# Patient Record
Sex: FEMALE | Race: WHITE | NOT HISPANIC OR LATINO | Employment: OTHER | URBAN - NONMETROPOLITAN AREA
[De-identification: names, ages, dates, MRNs, and addresses within clinical notes are randomized per-mention and may not be internally consistent; named-entity substitution may affect disease eponyms.]

---

## 2017-02-24 ENCOUNTER — OFFICE VISIT (OUTPATIENT)
Dept: URGENT CARE | Facility: PHYSICIAN GROUP | Age: 77
End: 2017-02-24
Payer: MEDICARE

## 2017-02-24 VITALS
DIASTOLIC BLOOD PRESSURE: 90 MMHG | RESPIRATION RATE: 16 BRPM | OXYGEN SATURATION: 94 % | WEIGHT: 150 LBS | HEIGHT: 65 IN | HEART RATE: 100 BPM | BODY MASS INDEX: 24.99 KG/M2 | SYSTOLIC BLOOD PRESSURE: 130 MMHG | TEMPERATURE: 97.6 F

## 2017-02-24 DIAGNOSIS — R51.9 NONINTRACTABLE HEADACHE, UNSPECIFIED CHRONICITY PATTERN, UNSPECIFIED HEADACHE TYPE: ICD-10-CM

## 2017-02-24 PROCEDURE — 1101F PT FALLS ASSESS-DOCD LE1/YR: CPT | Mod: 8P | Performed by: PHYSICIAN ASSISTANT

## 2017-02-24 PROCEDURE — 1036F TOBACCO NON-USER: CPT | Performed by: PHYSICIAN ASSISTANT

## 2017-02-24 PROCEDURE — 3017F COLORECTAL CA SCREEN DOC REV: CPT | Mod: 8P | Performed by: PHYSICIAN ASSISTANT

## 2017-02-24 PROCEDURE — G8484 FLU IMMUNIZE NO ADMIN: HCPCS | Performed by: PHYSICIAN ASSISTANT

## 2017-02-24 PROCEDURE — G8432 DEP SCR NOT DOC, RNG: HCPCS | Performed by: PHYSICIAN ASSISTANT

## 2017-02-24 PROCEDURE — G8420 CALC BMI NORM PARAMETERS: HCPCS | Performed by: PHYSICIAN ASSISTANT

## 2017-02-24 PROCEDURE — 99213 OFFICE O/P EST LOW 20 MIN: CPT | Performed by: PHYSICIAN ASSISTANT

## 2017-02-24 PROCEDURE — 3014F SCREEN MAMMO DOC REV: CPT | Mod: 8P | Performed by: PHYSICIAN ASSISTANT

## 2017-02-24 PROCEDURE — 4040F PNEUMOC VAC/ADMIN/RCVD: CPT | Mod: 8P | Performed by: PHYSICIAN ASSISTANT

## 2017-02-25 NOTE — PROGRESS NOTES
Chief Complaint   Patient presents with   • Otalgia     pain behind the ears       HISTORY OF PRESENT ILLNESS: Patient is a 76 y.o. female who presents today for evaluation of a headache that started about an hour ago. Patient's  states that he woke her up to see if she wanted to go get some pizza and she was complaining of a headache. She complains of pain on the left sided occipital region stating it is nontender to palpation but that she does have a pressure-like pain in that area. She denies dizziness, vision changes, nausea, vomiting, imbalance issues. She denies any histories of the same symptoms. She has not taken any over-the-counter medication.    Patient Active Problem List    Diagnosis Date Noted   • Dementia 12/19/2016   • Dysphagia 12/18/2016   • Hypokalemia 12/16/2016   • HTN (hypertension) 12/16/2016   • Cholelithiasis 12/16/2016   • Acute on chronic kidney failure (CMS-HCC) 12/16/2016   • Bipolar disorder (CMS-HCC) 12/16/2016   • Pancreatitis 12/16/2016   • Renal cyst 12/16/2016   • Pancreatic mass 12/15/2016       Allergies:Review of patient's allergies indicates no known allergies.    Current Outpatient Prescriptions Ordered in [facility]   Medication Sig Dispense Refill   • amlodipine (NORVASC) 10 MG Tab Take 1 Tab by mouth every day. 30 Tab 1   • lisinopril (PRINIVIL) 20 MG Tab Take 20 mg by mouth every day.     • raloxifene (EVISTA) 60 MG Tab Take 60 mg by mouth every day.     • oxcarbazepine (TRILEPTAL) 150 MG TABS Take 150 mg by mouth 2 times a day.     • fluphenazine (PROLIXIN) 2.5 MG TABS Take 2.5 mg by mouth every day.       No current [facility]-ordered facility-administered medications on file.       History reviewed. No pertinent past medical history.    Social History   Substance Use Topics   • Smoking status: Former Smoker   • Smokeless tobacco: Never Used   • Alcohol Use: No       No family status information on file.   History reviewed. No pertinent family history.    ROS:   Review of  "Systems   Constitutional: Negative for fever, chills, weight loss and malaise/fatigue.   HENT: Negative for ear pain, nosebleeds, congestion, sore throat and neck pain.    Eyes: Negative for blurred vision.   Respiratory: Negative for cough, sputum production, shortness of breath and wheezing.    Cardiovascular: Negative for chest pain, palpitations, orthopnea and leg swelling.   Gastrointestinal: Negative for heartburn, nausea, vomiting and abdominal pain.   Genitourinary: Negative for dysuria, urgency and frequency.       Exam:  Blood pressure 130/90, pulse 100, temperature 36.4 °C (97.6 °F), resp. rate 16, height 1.651 m (5' 5\"), weight 68.04 kg (150 lb), SpO2 94 %.  General: Normal appearing. No distress.  HEENT: Conjunctiva clear, lids without ptosis, ears normal shape and contour, canals are clear bilaterally, tympanic membranes are benign, nasal mucosa benign, oropharynx is without erythema, edema or exudates. The head is nontender to palpation in the area of pain.  Neck: Full range of motion but pain in the area of concern with rotation to the right. The neck itself is nontender to palpation.  Pulmonary: Clear to ausculation and percussion.  Normal effort. No rales, ronchi, or wheezing.   Cardiovascular: Regular rate and rhythm without murmur.   Neurologic: Grossly nonfocal. No tongue deviation noted. Negative pronator drift.  Skin: No obvious lesions.  Psych: Normal mood. Alert and oriented x3. Judgment and insight is normal.    Assessment/Plan:  Recommend applying heat to the affected area and trying over-the-counter acetaminophen. Follow up for worsening or persistent symptoms. Discussed ER precautions for any strokelike symptoms.  1. Nonintractable headache, unspecified chronicity pattern, unspecified headache type           "

## 2017-06-20 ENCOUNTER — RESOLUTE PROFESSIONAL BILLING HOSPITAL PROF FEE (OUTPATIENT)
Dept: HOSPITALIST | Facility: MEDICAL CENTER | Age: 77
End: 2017-06-20
Payer: MEDICARE

## 2017-06-20 ENCOUNTER — APPOINTMENT (OUTPATIENT)
Dept: RADIOLOGY | Facility: MEDICAL CENTER | Age: 77
End: 2017-06-20
Attending: EMERGENCY MEDICINE
Payer: MEDICARE

## 2017-06-20 ENCOUNTER — HOSPITAL ENCOUNTER (OUTPATIENT)
Facility: MEDICAL CENTER | Age: 77
End: 2017-06-21
Attending: EMERGENCY MEDICINE | Admitting: HOSPITALIST
Payer: MEDICARE

## 2017-06-20 DIAGNOSIS — I10 ESSENTIAL HYPERTENSION: ICD-10-CM

## 2017-06-20 DIAGNOSIS — R55 NEAR SYNCOPE: ICD-10-CM

## 2017-06-20 DIAGNOSIS — R00.2 PALPITATIONS: ICD-10-CM

## 2017-06-20 LAB
ALBUMIN SERPL BCP-MCNC: 4.3 G/DL (ref 3.2–4.9)
ALBUMIN/GLOB SERPL: 1.4 G/DL
ALP SERPL-CCNC: 61 U/L (ref 30–99)
ALT SERPL-CCNC: 16 U/L (ref 2–50)
ANION GAP SERPL CALC-SCNC: 10 MMOL/L (ref 0–11.9)
APPEARANCE UR: CLEAR
AST SERPL-CCNC: 15 U/L (ref 12–45)
BASOPHILS # BLD AUTO: 0.5 % (ref 0–1.8)
BASOPHILS # BLD: 0.02 K/UL (ref 0–0.12)
BILIRUB SERPL-MCNC: 0.5 MG/DL (ref 0.1–1.5)
BILIRUB UR QL STRIP.AUTO: NEGATIVE
BNP SERPL-MCNC: 11 PG/ML (ref 0–100)
BUN SERPL-MCNC: 27 MG/DL (ref 8–22)
CALCIUM SERPL-MCNC: 10.3 MG/DL (ref 8.5–10.5)
CHLORIDE SERPL-SCNC: 106 MMOL/L (ref 96–112)
CO2 SERPL-SCNC: 24 MMOL/L (ref 20–33)
COLOR UR: COLORLESS
CREAT SERPL-MCNC: 1.5 MG/DL (ref 0.5–1.4)
CULTURE IF INDICATED INDCX: NO UA CULTURE
DEPRECATED D DIMER PPP IA-ACNC: 485 NG/ML(D-DU)
EKG IMPRESSION: NORMAL
EKG IMPRESSION: NORMAL
EOSINOPHIL # BLD AUTO: 0.09 K/UL (ref 0–0.51)
EOSINOPHIL NFR BLD: 2.2 % (ref 0–6.9)
ERYTHROCYTE [DISTWIDTH] IN BLOOD BY AUTOMATED COUNT: 44.5 FL (ref 35.9–50)
EST. AVERAGE GLUCOSE BLD GHB EST-MCNC: 120 MG/DL
GFR SERPL CREATININE-BSD FRML MDRD: 34 ML/MIN/1.73 M 2
GLOBULIN SER CALC-MCNC: 3 G/DL (ref 1.9–3.5)
GLUCOSE SERPL-MCNC: 91 MG/DL (ref 65–99)
GLUCOSE UR STRIP.AUTO-MCNC: NEGATIVE MG/DL
HBA1C MFR BLD: 5.8 % (ref 0–5.6)
HCT VFR BLD AUTO: 40.6 % (ref 37–47)
HGB BLD-MCNC: 13.3 G/DL (ref 12–16)
IMM GRANULOCYTES # BLD AUTO: 0.01 K/UL (ref 0–0.11)
IMM GRANULOCYTES NFR BLD AUTO: 0.2 % (ref 0–0.9)
KETONES UR STRIP.AUTO-MCNC: 10 MG/DL
LEUKOCYTE ESTERASE UR QL STRIP.AUTO: NEGATIVE
LYMPHOCYTES # BLD AUTO: 1.71 K/UL (ref 1–4.8)
LYMPHOCYTES NFR BLD: 41 % (ref 22–41)
MAGNESIUM SERPL-MCNC: 2.1 MG/DL (ref 1.5–2.5)
MCH RBC QN AUTO: 28.6 PG (ref 27–33)
MCHC RBC AUTO-ENTMCNC: 32.8 G/DL (ref 33.6–35)
MCV RBC AUTO: 87.3 FL (ref 81.4–97.8)
MICRO URNS: ABNORMAL
MONOCYTES # BLD AUTO: 0.32 K/UL (ref 0–0.85)
MONOCYTES NFR BLD AUTO: 7.7 % (ref 0–13.4)
NEUTROPHILS # BLD AUTO: 2.02 K/UL (ref 2–7.15)
NEUTROPHILS NFR BLD: 48.4 % (ref 44–72)
NITRITE UR QL STRIP.AUTO: NEGATIVE
NRBC # BLD AUTO: 0 K/UL
NRBC BLD AUTO-RTO: 0 /100 WBC
PH UR STRIP.AUTO: 6.5 [PH]
PLATELET # BLD AUTO: 148 K/UL (ref 164–446)
PMV BLD AUTO: 8.6 FL (ref 9–12.9)
POTASSIUM SERPL-SCNC: 4.1 MMOL/L (ref 3.6–5.5)
PROT SERPL-MCNC: 7.3 G/DL (ref 6–8.2)
PROT UR QL STRIP: NEGATIVE MG/DL
RBC # BLD AUTO: 4.65 M/UL (ref 4.2–5.4)
RBC UR QL AUTO: NEGATIVE
SODIUM SERPL-SCNC: 140 MMOL/L (ref 135–145)
SP GR UR STRIP.AUTO: 1.01
T4 FREE SERPL-MCNC: 0.88 NG/DL (ref 0.53–1.43)
TROPONIN I SERPL-MCNC: <0.01 NG/ML (ref 0–0.04)
TSH SERPL DL<=0.005 MIU/L-ACNC: 0.73 UIU/ML (ref 0.3–3.7)
WBC # BLD AUTO: 4.2 K/UL (ref 4.8–10.8)

## 2017-06-20 PROCEDURE — 81003 URINALYSIS AUTO W/O SCOPE: CPT

## 2017-06-20 PROCEDURE — 71275 CT ANGIOGRAPHY CHEST: CPT

## 2017-06-20 PROCEDURE — 93005 ELECTROCARDIOGRAM TRACING: CPT

## 2017-06-20 PROCEDURE — 85379 FIBRIN DEGRADATION QUANT: CPT

## 2017-06-20 PROCEDURE — A9270 NON-COVERED ITEM OR SERVICE: HCPCS | Performed by: HOSPITALIST

## 2017-06-20 PROCEDURE — 85025 COMPLETE CBC W/AUTO DIFF WBC: CPT

## 2017-06-20 PROCEDURE — 84484 ASSAY OF TROPONIN QUANT: CPT

## 2017-06-20 PROCEDURE — 96361 HYDRATE IV INFUSION ADD-ON: CPT

## 2017-06-20 PROCEDURE — 700102 HCHG RX REV CODE 250 W/ 637 OVERRIDE(OP): Performed by: HOSPITALIST

## 2017-06-20 PROCEDURE — 83036 HEMOGLOBIN GLYCOSYLATED A1C: CPT

## 2017-06-20 PROCEDURE — 99285 EMERGENCY DEPT VISIT HI MDM: CPT

## 2017-06-20 PROCEDURE — G0378 HOSPITAL OBSERVATION PER HR: HCPCS

## 2017-06-20 PROCEDURE — 83880 ASSAY OF NATRIURETIC PEPTIDE: CPT

## 2017-06-20 PROCEDURE — 93005 ELECTROCARDIOGRAM TRACING: CPT | Performed by: HOSPITALIST

## 2017-06-20 PROCEDURE — 99220 PR INITIAL OBSERVATION CARE,LEVL III: CPT | Performed by: HOSPITALIST

## 2017-06-20 PROCEDURE — 96360 HYDRATION IV INFUSION INIT: CPT | Mod: XU

## 2017-06-20 PROCEDURE — 700105 HCHG RX REV CODE 258: Performed by: EMERGENCY MEDICINE

## 2017-06-20 PROCEDURE — 96372 THER/PROPH/DIAG INJ SC/IM: CPT | Mod: XU

## 2017-06-20 PROCEDURE — 93005 ELECTROCARDIOGRAM TRACING: CPT | Performed by: NURSE PRACTITIONER

## 2017-06-20 PROCEDURE — 71010 DX-CHEST-PORTABLE (1 VIEW): CPT

## 2017-06-20 PROCEDURE — 700105 HCHG RX REV CODE 258: Performed by: HOSPITALIST

## 2017-06-20 PROCEDURE — 84439 ASSAY OF FREE THYROXINE: CPT

## 2017-06-20 PROCEDURE — 93010 ELECTROCARDIOGRAM REPORT: CPT | Performed by: INTERNAL MEDICINE

## 2017-06-20 PROCEDURE — 80053 COMPREHEN METABOLIC PANEL: CPT

## 2017-06-20 PROCEDURE — 84443 ASSAY THYROID STIM HORMONE: CPT

## 2017-06-20 PROCEDURE — 700117 HCHG RX CONTRAST REV CODE 255: Performed by: EMERGENCY MEDICINE

## 2017-06-20 PROCEDURE — 36415 COLL VENOUS BLD VENIPUNCTURE: CPT

## 2017-06-20 PROCEDURE — 83735 ASSAY OF MAGNESIUM: CPT

## 2017-06-20 PROCEDURE — 700111 HCHG RX REV CODE 636 W/ 250 OVERRIDE (IP): Performed by: HOSPITALIST

## 2017-06-20 RX ORDER — AMLODIPINE BESYLATE 10 MG/1
10 TABLET ORAL DAILY
Status: DISCONTINUED | OUTPATIENT
Start: 2017-06-21 | End: 2017-06-21 | Stop reason: HOSPADM

## 2017-06-20 RX ORDER — OXCARBAZEPINE 150 MG/1
150 TABLET, FILM COATED ORAL 2 TIMES DAILY
Status: DISCONTINUED | OUTPATIENT
Start: 2017-06-20 | End: 2017-06-21 | Stop reason: HOSPADM

## 2017-06-20 RX ORDER — FLUPHENAZINE HYDROCHLORIDE 1 MG/1
2.5 TABLET ORAL DAILY
Status: DISCONTINUED | OUTPATIENT
Start: 2017-06-21 | End: 2017-06-21 | Stop reason: HOSPADM

## 2017-06-20 RX ORDER — LABETALOL HYDROCHLORIDE 5 MG/ML
10 INJECTION, SOLUTION INTRAVENOUS EVERY 4 HOURS PRN
Status: DISCONTINUED | OUTPATIENT
Start: 2017-06-20 | End: 2017-06-21 | Stop reason: HOSPADM

## 2017-06-20 RX ORDER — ASPIRIN 81 MG/1
324 TABLET, CHEWABLE ORAL DAILY
Status: DISCONTINUED | OUTPATIENT
Start: 2017-06-20 | End: 2017-06-21 | Stop reason: HOSPADM

## 2017-06-20 RX ORDER — LORAZEPAM 1 MG/1
0.5 TABLET ORAL EVERY 6 HOURS PRN
Status: DISCONTINUED | OUTPATIENT
Start: 2017-06-20 | End: 2017-06-21 | Stop reason: HOSPADM

## 2017-06-20 RX ORDER — LORAZEPAM 2 MG/ML
0.5 INJECTION INTRAMUSCULAR EVERY 6 HOURS PRN
Status: DISCONTINUED | OUTPATIENT
Start: 2017-06-20 | End: 2017-06-21 | Stop reason: HOSPADM

## 2017-06-20 RX ORDER — AMOXICILLIN 250 MG
2 CAPSULE ORAL 2 TIMES DAILY
Status: DISCONTINUED | OUTPATIENT
Start: 2017-06-20 | End: 2017-06-21 | Stop reason: HOSPADM

## 2017-06-20 RX ORDER — RALOXIFENE HYDROCHLORIDE 60 MG/1
60 TABLET, FILM COATED ORAL DAILY
Status: DISCONTINUED | OUTPATIENT
Start: 2017-06-21 | End: 2017-06-21 | Stop reason: HOSPADM

## 2017-06-20 RX ORDER — NITROGLYCERIN 0.4 MG/1
0.4 TABLET SUBLINGUAL
Status: DISCONTINUED | OUTPATIENT
Start: 2017-06-20 | End: 2017-06-21 | Stop reason: HOSPADM

## 2017-06-20 RX ORDER — LISINOPRIL 20 MG/1
20 TABLET ORAL DAILY
Status: DISCONTINUED | OUTPATIENT
Start: 2017-06-20 | End: 2017-06-20

## 2017-06-20 RX ORDER — ACETAMINOPHEN 325 MG/1
650 TABLET ORAL EVERY 6 HOURS PRN
Status: DISCONTINUED | OUTPATIENT
Start: 2017-06-20 | End: 2017-06-21 | Stop reason: HOSPADM

## 2017-06-20 RX ORDER — HEPARIN SODIUM 5000 [USP'U]/ML
5000 INJECTION, SOLUTION INTRAVENOUS; SUBCUTANEOUS EVERY 8 HOURS
Status: DISCONTINUED | OUTPATIENT
Start: 2017-06-20 | End: 2017-06-21 | Stop reason: HOSPADM

## 2017-06-20 RX ORDER — AMLODIPINE BESYLATE 10 MG/1
10 TABLET ORAL DAILY
COMMUNITY

## 2017-06-20 RX ORDER — ZOLPIDEM TARTRATE 5 MG/1
5 TABLET ORAL
Status: DISCONTINUED | OUTPATIENT
Start: 2017-06-20 | End: 2017-06-21 | Stop reason: HOSPADM

## 2017-06-20 RX ORDER — SODIUM CHLORIDE 9 MG/ML
INJECTION, SOLUTION INTRAVENOUS CONTINUOUS
Status: DISCONTINUED | OUTPATIENT
Start: 2017-06-20 | End: 2017-06-21 | Stop reason: HOSPADM

## 2017-06-20 RX ORDER — SODIUM CHLORIDE 9 MG/ML
1000 INJECTION, SOLUTION INTRAVENOUS ONCE
Status: COMPLETED | OUTPATIENT
Start: 2017-06-20 | End: 2017-06-20

## 2017-06-20 RX ORDER — POLYETHYLENE GLYCOL 3350 17 G/17G
1 POWDER, FOR SOLUTION ORAL
Status: DISCONTINUED | OUTPATIENT
Start: 2017-06-20 | End: 2017-06-21 | Stop reason: HOSPADM

## 2017-06-20 RX ORDER — BISACODYL 10 MG
10 SUPPOSITORY, RECTAL RECTAL
Status: DISCONTINUED | OUTPATIENT
Start: 2017-06-20 | End: 2017-06-21 | Stop reason: HOSPADM

## 2017-06-20 RX ORDER — ASPIRIN 325 MG
325 TABLET ORAL DAILY
Status: DISCONTINUED | OUTPATIENT
Start: 2017-06-20 | End: 2017-06-21 | Stop reason: HOSPADM

## 2017-06-20 RX ORDER — ASPIRIN 300 MG/1
300 SUPPOSITORY RECTAL DAILY
Status: DISCONTINUED | OUTPATIENT
Start: 2017-06-20 | End: 2017-06-21 | Stop reason: HOSPADM

## 2017-06-20 RX ORDER — ONDANSETRON 2 MG/ML
4 INJECTION INTRAMUSCULAR; INTRAVENOUS EVERY 4 HOURS PRN
Status: DISCONTINUED | OUTPATIENT
Start: 2017-06-20 | End: 2017-06-21 | Stop reason: HOSPADM

## 2017-06-20 RX ORDER — ONDANSETRON 4 MG/1
4 TABLET, ORALLY DISINTEGRATING ORAL EVERY 4 HOURS PRN
Status: DISCONTINUED | OUTPATIENT
Start: 2017-06-20 | End: 2017-06-21 | Stop reason: HOSPADM

## 2017-06-20 RX ORDER — MORPHINE SULFATE 4 MG/ML
1 INJECTION, SOLUTION INTRAMUSCULAR; INTRAVENOUS EVERY 4 HOURS PRN
Status: DISCONTINUED | OUTPATIENT
Start: 2017-06-20 | End: 2017-06-21 | Stop reason: HOSPADM

## 2017-06-20 RX ADMIN — IOHEXOL 100 ML: 350 INJECTION, SOLUTION INTRAVENOUS at 15:20

## 2017-06-20 RX ADMIN — METOPROLOL TARTRATE 12.5 MG: 25 TABLET, FILM COATED ORAL at 17:48

## 2017-06-20 RX ADMIN — OXCARBAZEPINE 150 MG: 150 TABLET, FILM COATED ORAL at 20:44

## 2017-06-20 RX ADMIN — SODIUM CHLORIDE: 9 INJECTION, SOLUTION INTRAVENOUS at 17:49

## 2017-06-20 RX ADMIN — HEPARIN SODIUM 5000 UNITS: 5000 INJECTION, SOLUTION INTRAVENOUS; SUBCUTANEOUS at 21:53

## 2017-06-20 RX ADMIN — SODIUM CHLORIDE 1000 ML: 9 INJECTION, SOLUTION INTRAVENOUS at 15:13

## 2017-06-20 RX ADMIN — SENNOSIDES AND DOCUSATE SODIUM 2 TABLET: 8.6; 5 TABLET ORAL at 20:44

## 2017-06-20 RX ADMIN — ASPIRIN 325 MG: 325 TABLET, COATED ORAL at 17:48

## 2017-06-20 ASSESSMENT — LIFESTYLE VARIABLES
EVER_SMOKED: NEVER
DO YOU DRINK ALCOHOL: NO
ALCOHOL_USE: NO

## 2017-06-20 ASSESSMENT — PAIN SCALES - GENERAL
PAINLEVEL_OUTOF10: 0

## 2017-06-20 NOTE — ED PROVIDER NOTES
ED Provider Note    CHIEF COMPLAINT  Chief Complaint   Patient presents with   • Palpitations     sx for 2 weeks.  EKG done in triage       HPI  Skylar Benjamin is a 76 y.o. female who presents with a two-week history of heavy anterior chest palpitation.  She denies rapid palpitations.  Today for the 1st time, she felt like she was going to pass out.  She denies exertional chest pain.  No orthopnea.  No new leg swelling.  Patient quit smoking years ago.  She denies fever.  No cough.  No nausea, no abdominal pain.  No headache, no acute numbness or weakness.  Patient states the palpitations come and go on a daily basis, stating they're more present than not.  She denies exacerbating factors.    REVIEW OF SYSTEMS    Constitutional: No fever.  Dizziness today.  Respiratory: Denies shortness of breath  Cardiac: Heart palpitations.  Near syncope  Gastrointestinal: No abdominal pain, no vomiting  Musculoskeletal: No neck or back pain  Neurologic: No headache  Skin: No edema       All other systems are negative.       PAST MEDICAL HISTORY  No past medical history on file.    FAMILY HISTORY  No family history on file.    SOCIAL HISTORY  Social History     Social History   • Marital Status:      Spouse Name: N/A   • Number of Children: N/A   • Years of Education: N/A     Social History Main Topics   • Smoking status: Former Smoker   • Smokeless tobacco: Never Used   • Alcohol Use: No   • Drug Use: No   • Sexual Activity: Not on file     Other Topics Concern   • Not on file     Social History Narrative       SURGICAL HISTORY  No past surgical history on file.    CURRENT MEDICATIONS  Home Medications     Reviewed by Pam Anne (Pharmacy Tech) on 06/20/17 at 1531  Med List Status: Complete    Medication Last Dose Status    amlodipine (NORVASC) 10 MG Tab 6/20/2017 Active    Cyanocobalamin 1000 MCG SL Tab 6/15/2017 Active    fluphenazine (PROLIXIN) 2.5 MG TABS 6/20/2017 Active    oxcarbazepine (TRILEPTAL) 150 MG TABS  "6/20/2017 Active    raloxifene (EVISTA) 60 MG Tab 6/20/2017 Active    vitamin D (CHOLECALCIFEROL) 1000 UNIT Tab 6/15/2017 Active                ALLERGIES  No Known Allergies    PHYSICAL EXAM  VITAL SIGNS: /95 mmHg  Pulse 89  Temp(Src) 37.1 °C (98.8 °F)  Resp 25  Ht 1.651 m (5' 5\")  Wt 65.3 kg (143 lb 15.4 oz)  BMI 23.96 kg/m2  SpO2 91%  Constitutional:  Non-toxic appearance.   HENT: No facial swelling  Eyes: Anicteric, no conjunctivitis.     Cardiovascular: Normal heart rate, Normal rhythm  Pulmonary:  No wheezing, No rales.  Diminished breath sounds in both bases.  Gastrointestinal: Soft, No tenderness, No masses  Skin: Warm, Dry, No cyanosis.  No peripheral edema  Neurologic: Speech is clear, follows commands, facial expression is symmetrical.  Sensation normal  Psychiatric: Affect normal,  Mood normal.   Musculoskeletal: Negative Homans sign.  Chest wall nontender    EKG/Labs  Results for orders placed or performed during the hospital encounter of 06/20/17   CBC WITH DIFFERENTIAL   Result Value Ref Range    WBC 4.2 (L) 4.8 - 10.8 K/uL    RBC 4.65 4.20 - 5.40 M/uL    Hemoglobin 13.3 12.0 - 16.0 g/dL    Hematocrit 40.6 37.0 - 47.0 %    MCV 87.3 81.4 - 97.8 fL    MCH 28.6 27.0 - 33.0 pg    MCHC 32.8 (L) 33.6 - 35.0 g/dL    RDW 44.5 35.9 - 50.0 fL    Platelet Count 148 (L) 164 - 446 K/uL    MPV 8.6 (L) 9.0 - 12.9 fL    Neutrophils-Polys 48.40 44.00 - 72.00 %    Lymphocytes 41.00 22.00 - 41.00 %    Monocytes 7.70 0.00 - 13.40 %    Eosinophils 2.20 0.00 - 6.90 %    Basophils 0.50 0.00 - 1.80 %    Immature Granulocytes 0.20 0.00 - 0.90 %    Nucleated RBC 0.00 /100 WBC    Neutrophils (Absolute) 2.02 2.00 - 7.15 K/uL    Lymphs (Absolute) 1.71 1.00 - 4.80 K/uL    Monos (Absolute) 0.32 0.00 - 0.85 K/uL    Eos (Absolute) 0.09 0.00 - 0.51 K/uL    Baso (Absolute) 0.02 0.00 - 0.12 K/uL    Immature Granulocytes (abs) 0.01 0.00 - 0.11 K/uL    NRBC (Absolute) 0.00 K/uL   COMP METABOLIC PANEL   Result Value Ref Range "    Sodium 140 135 - 145 mmol/L    Potassium 4.1 3.6 - 5.5 mmol/L    Chloride 106 96 - 112 mmol/L    Co2 24 20 - 33 mmol/L    Anion Gap 10.0 0.0 - 11.9    Glucose 91 65 - 99 mg/dL    Bun 27 (H) 8 - 22 mg/dL    Creatinine 1.50 (H) 0.50 - 1.40 mg/dL    Calcium 10.3 8.5 - 10.5 mg/dL    AST(SGOT) 15 12 - 45 U/L    ALT(SGPT) 16 2 - 50 U/L    Alkaline Phosphatase 61 30 - 99 U/L    Total Bilirubin 0.5 0.1 - 1.5 mg/dL    Albumin 4.3 3.2 - 4.9 g/dL    Total Protein 7.3 6.0 - 8.2 g/dL    Globulin 3.0 1.9 - 3.5 g/dL    A-G Ratio 1.4 g/dL   TROPONIN   Result Value Ref Range    Troponin I <0.01 0.00 - 0.04 ng/mL   BTYPE NATRIURETIC PEPTIDE   Result Value Ref Range    B Natriuretic Peptide 11 0 - 100 pg/mL   TSH   Result Value Ref Range    TSH 0.730 0.300 - 3.700 uIU/mL   FREE THYROXINE   Result Value Ref Range    Free T-4 0.88 0.53 - 1.43 ng/dL   D-DIMER   Result Value Ref Range    D-Dimer Screen 485 (H) <250 ng/mL(D-DU)   ESTIMATED GFR   Result Value Ref Range    GFR If  41 (A) >60 mL/min/1.73 m 2    GFR If Non  34 (A) >60 mL/min/1.73 m 2   EKG (NOW)   Result Value Ref Range    Report       [facility]    Test Date:  2017  Pt Name:    SATURNINO JACOBS                    Department: ER  MRN:        [id number]                      Room:  Gender:     F                            Technician: [id number]  :        [id number]                   Requested By:ER TRIAGE PROTOCOL  Order #:    [id number]                    Natalie MD:    Measurements  Intervals                                Axis  Rate:       91                           P:          -65  HI:         216                          QRS:        49  QRSD:       128                          T:          -13  QT:         368  QTc:        453    Interpretive Statements  SINUS OR ECTOPIC ATRIAL RHYTHM  FIRST DEGREE AV BLOCK  PROBABLE LEFT ATRIAL ABNORMALITY  RIGHT BUNDLE BRANCH BLOCK  BASELINE WANDER IN LEAD(S) V5  No previous  ECG available for comparison           RADIOLOGY/PROCEDURES  DX-CHEST-PORTABLE (1 VIEW)   Final Result         1. No acute cardiopulmonary abnormalities are identified.      CT-CTA CHEST PULMONARY ARTERY W/ RECONS    (Results Pending)   NM-CARDIAC STRESS TEST    (Results Pending)         COURSE & MEDICAL DECISION MAKING  Pertinent Labs & Imaging studies reviewed. (See chart for details)  Post contacted by the radiology department regarding the patient's creatinine at 1.5, slightly high for IV contrast.  However given the EKG change, and after discussion with cardiology, plan for immediate IV hydration with CT scan was needed at this time to determine whether the ST segment changes are secondary to pulmonary embolism versus possible ischemia.  The patient did have a positive d-dimer prompting the ordering of CT scan.  Patient is currently without chest pain.  Her blood pressure improved while she was in the ER.  She is admitted for ongoing workup and medical stabilization.  The patient had been given aspirin prehospital.    FINAL IMPRESSION     1. Palpitations    2. Essential hypertension    3. Near syncope                    Electronically signed by: Murtaza Valentin, 6/20/2017 3:41 PM

## 2017-06-20 NOTE — ED NOTES
Med rec updated and complete.  Allergies reviewed.  Dicussed current medications with  at bedside.   stated that he takes care of his wife's medications.

## 2017-06-20 NOTE — ED NOTES
Report to CDU RN. Pt aware of POC/ pt transferred to floor by RN/ belongings on gurney/ s/o at bedside.

## 2017-06-20 NOTE — DISCHARGE PLANNING
Care Transition Team Assessment    Information Source  Orientation : Oriented x 4  Information Given By: Patient  Who is responsible for making decisions for patient? : Patient    Readmission Evaluation  Is this a readmission?: No    Elopement Risk  Legal Hold: No  Ambulatory or Self Mobile in Wheelchair: No-Not an Elopement Risk    Interdisciplinary Discharge Planning  Does Admitting Nurse Feel This Could be a Complex Discharge?: No  Primary Care Physician: Kraig  Lives with - Patient's Self Care Capacity: Unable To Determine At This Time  Support Systems: Family Member(s), Spouse / Significant Other  Housing / Facility: [address]  Do You Take your Prescribed Medications Regularly: Yes  Able to Return to Previous ADL's: Yes  Mobility Issues: No  Prior Services: None  Patient Expects to be Discharged to:: home  Assistance Needed: No    Discharge Preparedness  What is your plan after discharge?: Uncertain - pending medical team collaboration  What are your discharge supports?: Spouse  Prior Functional Level: Ambulatory  Difficulity with ADLs: None  Difficulity with IADLs: None    Functional Assesment  Prior Functional Level: Ambulatory    Finances  Financial Barriers to Discharge: No  Prescription Coverage: Yes    Vision / Hearing Impairment  Vision Impairment : No  Hearing Impairment : No    Values / Beliefs / Concerns  Values / Beliefs Concerns : No    Advance Directive  Advance Directive?: None, DPOA for Health Care  Durable Power of  Name and Contact :   Advance Directive offered?: AD Booklet given    Domestic Abuse  Have you ever been the victim of abuse or violence?: No  Physical Abuse or Sexual Abuse: No  Verbal Abuse or Emotional Abuse: No  Possible Abuse Reported to:: Not Applicable    Psychological Assessment  History of Substance Abuse: None  History of Psychiatric Problems: No  Non-compliant with Treatment: No  Newly Diagnosed Illness: No    Discharge Risks or Barriers  Discharge risks  or barriers?: No    Anticipated Discharge Information  Anticipated discharge disposition: Home  Discharge Address: face sheet

## 2017-06-20 NOTE — ED NOTES
Chief Complaint   Patient presents with   • Palpitations     sx for 2 weeks.  EKG done in triage       Pt denies pain or SOB

## 2017-06-20 NOTE — IP AVS SNAPSHOT
" Home Care Instructions                                                                                                                  Name:Skylar Benjamin  Medical Record Number:[id number]  CSN: [id number]    YOB: 1940   Age: 76 y.o.  Sex: female  HT:1.651 m (5' 5\") WT: 65.3 kg (143 lb 15.4 oz)          Admit Date: 6/20/2017     Discharge Date:   Today's Date: 6/21/2017  Attending Doctor:  Edwin Bonner M.D.                  Allergies:  Review of patient's allergies indicates no known allergies.            Discharge Instructions       Discharge Instructions    Discharged to home by car with relative. Discharged via wheelchair, hospital escort: Yes.  Special equipment needed: Not Applicable    Be sure to schedule a follow-up appointment with your primary care doctor or any specialists as instructed.     Discharge Plan:   Diet Plan: Discussed  Activity Level: Discussed  Confirmed Follow up Appointment: Patient to Call and Schedule Appointment  Confirmed Symptoms Management: Discussed  Medication Reconciliation Updated: Yes  Influenza Vaccine Indication: Patient Refuses    I understand that a diet low in cholesterol, fat, and sodium is recommended for good health. Unless I have been given specific instructions below for another diet, I accept this instruction as my diet prescription.   Other diet: Regular    Special Instructions: None    · Is patient discharged on Warfarin / Coumadin?   No     · Is patient Post Blood Transfusion?  No    Depression / Suicide Risk    As you are discharged from this [facility] facility, it is important to learn how to keep safe from harming yourself.    Recognize the warning signs:  · Abrupt changes in personality, positive or negative- including increase in energy   · Giving away possessions  · Change in eating patterns- significant weight changes-  positive or negative  · Change in sleeping patterns- unable to sleep or sleeping all the time   · Unwillingness or inability to " communicate  · Depression  · Unusual sadness, discouragement and loneliness  · Talk of wanting to die  · Neglect of personal appearance   · Rebelliousness- reckless behavior  · Withdrawal from people/activities they love  · Confusion- inability to concentrate     If you or a loved one observes any of these behaviors or has concerns about self-harm, here's what you can do:  · Talk about it- your feelings and reasons for harming yourself  · Remove any means that you might use to hurt yourself (examples: pills, rope, extension cords, firearm)  · Get professional help from the community (Mental Health, Substance Abuse, psychological counseling)  · Do not be alone:Call your Safe Contact- someone whom you trust who will be there for you.  · Call your local CRISIS HOTLINE 454-4909 or 256-383-2733  · Call your local Children's Mobile Crisis Response Team [place] (745) 701-1715 or www.Zignals  · Call the toll free National Suicide Prevention Hotlines   · National Suicide Prevention Lifeline 952-801-SSWQ (5060)  · [employer] Hope Line Network 800-SUICIDE (060-6592)        Follow-up Information     1. Follow up with Micky Cornell M.D.. Schedule an appointment as soon as possible for a visit in 1 week.    Specialty:  Family Medicine    Contact information    [address]  [place] [address] [phone]           Discharge Medication Instructions:    Below are the medications your physician expects you to take upon discharge:    Review all your home medications and newly ordered medications with your doctor and/or pharmacist. Follow medication instructions as directed by your doctor and/or pharmacist.    Please keep your medication list with you and share with your physician.               Medication List      START taking these medications        Instructions    Morning Afternoon Evening Bedtime    metoprolol SR 25 MG Tb24   Commonly known as:  TOPROL XL        Take 1 Tab by mouth every day.   Dose:  25 mg                           CONTINUE taking these medications        Instructions    Morning Afternoon Evening Bedtime    amlodipine 10 MG Tabs   Last time this was given:  10 mg on 6/21/2017 10:33 AM   Commonly known as:  NORVASC        Take 10 mg by mouth every day.   Dose:  10 mg                        Cyanocobalamin 1000 MCG Subl        Place  under tongue.                        fluphenazine 2.5 MG Tabs   Last time this was given:  2.5 mg on 6/21/2017 10:34 AM   Commonly known as:  PROLIXIN        Take 2.5 mg by mouth every day.   Dose:  2.5 mg                        oxcarbazepine 150 MG Tabs   Last time this was given:  150 mg on 6/21/2017 10:35 AM   Commonly known as:  TRILEPTAL        Take 150 mg by mouth 2 times a day.   Dose:  150 mg                        raloxifene 60 MG Tabs   Last time this was given:  60 mg on 6/21/2017 10:35 AM   Commonly known as:  EVISTA        Take 60 mg by mouth every day.   Dose:  60 mg                        vitamin D 1000 UNIT Tabs   Commonly known as:  cholecalciferol        Take 1,000 Units by mouth every day.   Dose:  1000 Units                             Where to Get Your Medications      These medications were sent to [facility] #[id number] - [place] - [address]  [address] [place] [address]     Phone:  [phone]    - metoprolol SR 25 MG Tb24            Instructions           Diet / Nutrition:    Follow any diet instructions given to you by your doctor or the dietician, including how much salt (sodium) you are allowed each day.    If you are overweight, talk to your doctor about a weight reduction plan.    Activity:    Remain physically active following your doctor's instructions about exercise and activity.    Rest often.     Any time you become even a little tired or short of breath, SIT DOWN and rest.    Worsening Symptoms:    Report any of the following signs and symptoms to the doctor's office immediately:    *Pain of jaw, arm, or neck  *Chest pain not  relieved by medication                               *Dizziness or loss of consciousness  *Difficulty breathing even when at rest   *More tired than usual                                       *Bleeding drainage or swelling of surgical site  *Swelling of feet, ankles, legs or stomach                 *Fever (>100ºF)  *Pink or blood tinged sputum  *Weight gain (3lbs/day or 5lbs /week)           *Shock from internal defibrillator (if applicable)  *Palpitations or irregular heartbeats                *Cool and/or numb extremities    Stroke Awareness    Common Risk Factors for Stroke include:    Age  Atrial Fibrillation  Carotid Artery Stenosis  Diabetes Mellitus  Excessive alcohol consumption  High blood pressure  Overweight   Physical inactivity  Smoking    Warning signs and symptoms of a stroke include:    *Sudden numbness or weakness of the face, arm or leg (especially on one side of the body).  *Sudden confusion, trouble speaking or understanding.  *Sudden trouble seeing in one or both eyes.  *Sudden trouble walking, dizziness, loss of balance or coordination.Sudden severe headache with no known cause.    It is very important to get treatment quickly when a stroke occurs. If you experience any of the above warning signs, call 911 immediately.                   Disclaimer         Quit Smoking / Tobacco Use:    I understand the use of any tobacco products increases my chance of suffering from future heart disease or stroke and could cause other illnesses which may shorten my life. Quitting the use of tobacco products is the single most important thing I can do to improve my health. For further information on smoking / tobacco cessation call a Toll Free Quit Line at 1-298.468.5503 (*[employer]) or 1-168.388.7968 (American Lung Association) or you can access the web based program at www.lungusa.org.    [place] Tobacco Users Help Line:  (528) 999-5109       Toll Free: 1-595.967.6102  Quit Tobacco Program  [facility] (078)728-2738    Crisis Hotline:    [place] Crisis Hotline:  3-507-YVPQXHF or 1-350.730.5890    [place] Crisis Hotline:    1-680.463.3352 or 388-142-6232    Discharge Survey:   Thank you for choosing [facility]. We hope we did everything we could to make your hospital stay a pleasant one. You may be receiving a phone survey and we would appreciate your time and participation in answering the questions. Your input is very valuable to us in our efforts to improve our service to our patients and their families.        My signature on this form indicates that:    1. I have reviewed and understand the above information.  2. My questions regarding this information have been answered to my satisfaction.  3. I have formulated a plan with my discharge nurse to obtain my prescribed medications for home.                  Disclaimer         __________________________________                     __________       ________                       Patient Signature                                                 Date                    Time

## 2017-06-21 ENCOUNTER — APPOINTMENT (OUTPATIENT)
Dept: RADIOLOGY | Facility: MEDICAL CENTER | Age: 77
End: 2017-06-21
Attending: HOSPITALIST
Payer: MEDICARE

## 2017-06-21 ENCOUNTER — PATIENT OUTREACH (OUTPATIENT)
Dept: HEALTH INFORMATION MANAGEMENT | Facility: OTHER | Age: 77
End: 2017-06-21

## 2017-06-21 VITALS
OXYGEN SATURATION: 94 % | HEIGHT: 65 IN | TEMPERATURE: 98.6 F | HEART RATE: 80 BPM | SYSTOLIC BLOOD PRESSURE: 131 MMHG | RESPIRATION RATE: 20 BRPM | DIASTOLIC BLOOD PRESSURE: 81 MMHG | WEIGHT: 143.96 LBS | BODY MASS INDEX: 23.99 KG/M2

## 2017-06-21 PROBLEM — R00.2 HEART PALPITATIONS: Status: RESOLVED | Noted: 2017-06-20 | Resolved: 2017-06-21

## 2017-06-21 PROBLEM — N18.30 STAGE 3 CHRONIC KIDNEY DISEASE (HCC): Status: ACTIVE | Noted: 2017-06-21

## 2017-06-21 PROBLEM — N18.30 STAGE 3 CHRONIC KIDNEY DISEASE: Chronic | Status: ACTIVE | Noted: 2017-06-21

## 2017-06-21 LAB
ANION GAP SERPL CALC-SCNC: 7 MMOL/L (ref 0–11.9)
BUN SERPL-MCNC: 28 MG/DL (ref 8–22)
CALCIUM SERPL-MCNC: 10 MG/DL (ref 8.5–10.5)
CHLORIDE SERPL-SCNC: 110 MMOL/L (ref 96–112)
CHOLEST SERPL-MCNC: 242 MG/DL (ref 100–199)
CO2 SERPL-SCNC: 23 MMOL/L (ref 20–33)
CREAT SERPL-MCNC: 1.51 MG/DL (ref 0.5–1.4)
EKG IMPRESSION: NORMAL
EKG IMPRESSION: NORMAL
GFR SERPL CREATININE-BSD FRML MDRD: 34 ML/MIN/1.73 M 2
GLUCOSE SERPL-MCNC: 90 MG/DL (ref 65–99)
HDLC SERPL-MCNC: 51 MG/DL
LDLC SERPL CALC-MCNC: 152 MG/DL
LV EJECT FRACT  99904: 60
LV EJECT FRACT MOD 2C 99903: 49.3
LV EJECT FRACT MOD 4C 99902: 66.85
LV EJECT FRACT MOD BP 99901: 60.52
POTASSIUM SERPL-SCNC: 3.9 MMOL/L (ref 3.6–5.5)
SODIUM SERPL-SCNC: 140 MMOL/L (ref 135–145)
TRIGL SERPL-MCNC: 197 MG/DL (ref 0–149)

## 2017-06-21 PROCEDURE — A9502 TC99M TETROFOSMIN: HCPCS

## 2017-06-21 PROCEDURE — 96361 HYDRATE IV INFUSION ADD-ON: CPT

## 2017-06-21 PROCEDURE — 99217 PR OBSERVATION CARE DISCHARGE: CPT | Performed by: HOSPITALIST

## 2017-06-21 PROCEDURE — 96372 THER/PROPH/DIAG INJ SC/IM: CPT | Mod: XU

## 2017-06-21 PROCEDURE — 93306 TTE W/DOPPLER COMPLETE: CPT | Mod: 26 | Performed by: INTERNAL MEDICINE

## 2017-06-21 PROCEDURE — 80048 BASIC METABOLIC PNL TOTAL CA: CPT

## 2017-06-21 PROCEDURE — 93306 TTE W/DOPPLER COMPLETE: CPT

## 2017-06-21 PROCEDURE — 700111 HCHG RX REV CODE 636 W/ 250 OVERRIDE (IP): Performed by: HOSPITALIST

## 2017-06-21 PROCEDURE — 700105 HCHG RX REV CODE 258: Performed by: HOSPITALIST

## 2017-06-21 PROCEDURE — 700102 HCHG RX REV CODE 250 W/ 637 OVERRIDE(OP): Performed by: HOSPITALIST

## 2017-06-21 PROCEDURE — A9270 NON-COVERED ITEM OR SERVICE: HCPCS | Performed by: HOSPITALIST

## 2017-06-21 PROCEDURE — G0378 HOSPITAL OBSERVATION PER HR: HCPCS

## 2017-06-21 PROCEDURE — 80061 LIPID PANEL: CPT

## 2017-06-21 PROCEDURE — 700111 HCHG RX REV CODE 636 W/ 250 OVERRIDE (IP)

## 2017-06-21 PROCEDURE — 36415 COLL VENOUS BLD VENIPUNCTURE: CPT

## 2017-06-21 RX ORDER — REGADENOSON 0.08 MG/ML
INJECTION, SOLUTION INTRAVENOUS
Status: COMPLETED
Start: 2017-06-21 | End: 2017-06-21

## 2017-06-21 RX ORDER — METOPROLOL SUCCINATE 25 MG/1
25 TABLET, EXTENDED RELEASE ORAL DAILY
Qty: 30 TAB | Refills: 2 | Status: SHIPPED | OUTPATIENT
Start: 2017-06-21

## 2017-06-21 RX ADMIN — SENNOSIDES AND DOCUSATE SODIUM 2 TABLET: 8.6; 5 TABLET ORAL at 10:37

## 2017-06-21 RX ADMIN — REGADENOSON 0.4 MG: 0.08 INJECTION, SOLUTION INTRAVENOUS at 09:14

## 2017-06-21 RX ADMIN — OXCARBAZEPINE 150 MG: 150 TABLET, FILM COATED ORAL at 10:35

## 2017-06-21 RX ADMIN — FLUPHENAZINE HYDROCHLORIDE 2.5 MG: 1 TABLET, FILM COATED ORAL at 10:34

## 2017-06-21 RX ADMIN — SODIUM CHLORIDE: 9 INJECTION, SOLUTION INTRAVENOUS at 05:55

## 2017-06-21 RX ADMIN — ASPIRIN 325 MG: 325 TABLET, COATED ORAL at 10:33

## 2017-06-21 RX ADMIN — AMLODIPINE BESYLATE 10 MG: 10 TABLET ORAL at 10:33

## 2017-06-21 RX ADMIN — METOPROLOL TARTRATE 12.5 MG: 25 TABLET, FILM COATED ORAL at 10:33

## 2017-06-21 RX ADMIN — HEPARIN SODIUM 5000 UNITS: 5000 INJECTION, SOLUTION INTRAVENOUS; SUBCUTANEOUS at 14:40

## 2017-06-21 RX ADMIN — HEPARIN SODIUM 5000 UNITS: 5000 INJECTION, SOLUTION INTRAVENOUS; SUBCUTANEOUS at 05:55

## 2017-06-21 RX ADMIN — RALOXIFENE HYDROCHLORIDE 60 MG: 60 TABLET, FILM COATED ORAL at 10:35

## 2017-06-21 ASSESSMENT — LIFESTYLE VARIABLES: EVER_SMOKED: NEVER

## 2017-06-21 ASSESSMENT — PAIN SCALES - GENERAL
PAINLEVEL_OUTOF10: 0
PAINLEVEL_OUTOF10: 1
PAINLEVEL_OUTOF10: 0

## 2017-06-21 NOTE — DISCHARGE SUMMARY
Hospital Medicine Discharge Note     Patient ID:  Skylar Benjamin  [id number]  76 y.o.female  1940    Admit Date:  6/20/2017       Discharge Date:   6/21/2017    Primary Care Provider: Micky Cornell M.D.    Admitting Physician: Adam Rowley M.D.  Discharging Physician: Edwin Bonner MD    Chief Complaint: palpitations    Discharge Diagnoses:     Heart palpitations- resolved    Negative nuclear medicine stress test    Hyperlipidemia    Chronic Medical Problems:  HTN  Seizures  Psychosis  Osteoporosis    Code Status: DNR    Hospital Summary:       Please refer to H&P by Dr Rowley on 6/20/2017 for full details.      In brief, Skylar Benjamin is a 76 y.o. female who was admitted 6/20/2017 for palpitations for 2 weeks, described as heavy anterior chest palpitations that are intermittent. She was without CP or SOB. Her seizures have been controlled on her Trileptal. She denies previous history of palpitations. She was placed under observation status. She underwent a cardiac workup.    The patient was observed overnight on telemetry, without reported event. She underwent serial troponin monitoring that remained negative. She underwent CTA to rule out PE that was negative. She underwent a stress test that was negative. Today, the patient is without complaints of palpitations. She is feeling well, and functioning at baseline. Her vitals have remained stable. She appears well and ready for discharge home. She will follow up with her PCP. We have discussed her lipid panel is elevated, I suggest she employ lifestyle modifications with cardiac diet, weight loss, and exercise. Her PCP should follow her lipid panel and she may benefit from lipid lowering medications if these measures are not effective. Her BP was elevated, thus she was started on metoprolol for BP control and to control her palpitations. This appears to have controlled her symptoms and she has tolerated this well. This will be continued for discharge.      Therefore, she is discharged in good and stable condition with close outpatient follow-up.    Consultants:      None    Studies:    Imaging/ Testing:      ECHOCARDIOGRAM COMP W/O CONT   Final Result     CONCLUSIONS  Normal left ventricular systolic function.  Left ventricular ejection fraction is visually estimated to be 60%.  Right heart pressures are normal.  No significant valve abnormalities.   No prior study is available for comparison.      NM-CARDIAC STRESS TEST   Final Result      NUCLEAR IMAGING INTERPRETATION   No evidence of significant jeopardized viable myocardium or prior myocardial    infarction.   Normal left ventricular size, ejection fraction, and wall motion.     CT-CTA CHEST PULMONARY ARTERY W/ RECONS   Final Result      1. No CT evidence of pulmonary embolism.      2. Mildly nodular opacity in the left lingula, likely atelectasis or scarring      3. Heterogeneous appearance of the thyroid.      DX-CHEST-PORTABLE (1 VIEW)   Final Result      1. No acute cardiopulmonary abnormalities are identified.            Laboratory:   Recent Labs      06/20/17   1355   WBC  4.2*   RBC  4.65   HEMOGLOBIN  13.3   HEMATOCRIT  40.6   MCV  87.3   MCH  28.6   MCHC  32.8*   RDW  44.5   PLATELETCT  148*   MPV  8.6*       Recent Labs      06/20/17   1355  06/21/17   0255   SODIUM  140  140   POTASSIUM  4.1  3.9   CHLORIDE  106  110   CO2  24  23   GLUCOSE  91  90   BUN  27*  28*   CREATININE  1.50*  1.51*   CALCIUM  10.3  10.0       Recent Labs      06/20/17   1355  06/21/17   0255   ALTSGPT  16   --    ASTSGOT  15   --    ALKPHOSPHAT  61   --    TBILIRUBIN  0.5   --    GLUCOSE  91  90        Recent Labs      06/20/17   1355   BNPBTYPENAT  11       Recent Labs      06/21/17   0255   TRIGLYCERIDE  197*   HDL  51   LDL  152*       Recent Labs      06/20/17   1355  06/20/17   1750  06/20/17   2200   TROPONINI  <0.01  <0.01  <0.01       Recent Labs      06/20/17   1355   TSHULTRASEN  0.730     D-Dimer 485    Results      Procedure Component Value Units Date/Time    URINALYSIS,CULTURE IF INDICATED [[id number]]  (Abnormal) Collected:  06/20/17 1809    Order Status:  Completed Specimen Information:  Urine Updated:  06/20/17 1819     Color Colorless      Character Clear      Specific Gravity 1.012      Ph 6.5      Glucose Negative mg/dL      Ketones 10 (A) mg/dL      Protein Negative mg/dL      Bilirubin Negative      Nitrite Negative      Leukocyte Esterase Negative      Occult Blood Negative      Micro Urine Req see below      Comment: Microscopic examination not performed when specimen is clear  and chemically negative for protein, blood, leukocyte esterase  and nitrite.          Culture Indicated No UA Culture           Procedures/Surgeries:        None    Disposition:  Discharge home    Condition:  Stable    Instructions:   Activity: As tolerated.  Diet: cardiac  Followup:   -PCP 1 weeks  Instructions:  -Given instructions to return to the ER if any new or worsening symptoms, worsening condition, or failure to improve  -Call PCP for followup  -No smoking, no alcohol, no caffeine  -Encourage risk factor reduction with tobacco and alcohol abstinence, diet changes, weight loss, and exercise.     Discharge Medications:           Medication List      START taking these medications       Instructions    metoprolol SR 25 MG Tb24   Commonly known as:  TOPROL XL    Take 1 Tab by mouth every day.   Dose:  25 mg         CONTINUE taking these medications       Instructions    amlodipine 10 MG Tabs   Last time this was given:  10 mg on 6/21/2017 10:33 AM   Commonly known as:  NORVASC    Take 10 mg by mouth every day.   Dose:  10 mg       Cyanocobalamin 1000 MCG Subl    Place  under tongue.       fluphenazine 2.5 MG Tabs   Last time this was given:  2.5 mg on 6/21/2017 10:34 AM   Commonly known as:  PROLIXIN    Take 2.5 mg by mouth every day.   Dose:  2.5 mg       oxcarbazepine 150 MG Tabs   Last time this was given:  150 mg on 6/21/2017 10:35  AM   Commonly known as:  TRILEPTAL    Take 150 mg by mouth 2 times a day.   Dose:  150 mg       raloxifene 60 MG Tabs   Last time this was given:  60 mg on 6/21/2017 10:35 AM   Commonly known as:  EVISTA    Take 60 mg by mouth every day.   Dose:  60 mg       vitamin D 1000 UNIT Tabs   Commonly known as:  cholecalciferol    Take 1,000 Units by mouth every day.   Dose:  1000 Units           RX sent to [id number] - [place], [place] - [address]    Please CC the above physicians    BERNARDINO Cortés  6/21/2017  3:42 PM

## 2017-06-21 NOTE — THERAPY
PT orders received, eval not warranted as pt notes no change in function compared to PTA. Pt reports no perceived deficits in strength, balance or gait; and no need for AD at this time. Will D/C current orders, should pt status change please reorder. Thanks    Melissa Ross, PT, DPT Pager: [phone]

## 2017-06-21 NOTE — PROGRESS NOTES
A/o,assessment completed per CDU,poc discussed,verbalized understanding,tolerating po well,denies any discomfort,dizziness,blurry vision,n/t, noted for coughing, ST on tele,he=94,call button within reach,will continue to monitor.

## 2017-06-21 NOTE — DISCHARGE INSTRUCTIONS
Discharge Instructions    Discharged to home by car with relative. Discharged via wheelchair, hospital escort: Yes.  Special equipment needed: Not Applicable    Be sure to schedule a follow-up appointment with your primary care doctor or any specialists as instructed.     Discharge Plan:   Diet Plan: Discussed  Activity Level: Discussed  Confirmed Follow up Appointment: Patient to Call and Schedule Appointment  Confirmed Symptoms Management: Discussed  Medication Reconciliation Updated: Yes  Influenza Vaccine Indication: Patient Refuses    I understand that a diet low in cholesterol, fat, and sodium is recommended for good health. Unless I have been given specific instructions below for another diet, I accept this instruction as my diet prescription.   Other diet: Regular    Special Instructions: None    · Is patient discharged on Warfarin / Coumadin?   No     · Is patient Post Blood Transfusion?  No    Depression / Suicide Risk    As you are discharged [facility], it is important to learn how to keep safe from harming yourself.    Recognize the warning signs:  · Abrupt changes in personality, positive or negative- including increase in energy   · Giving away possessions  · Change in eating patterns- significant weight changes-  positive or negative  · Change in sleeping patterns- unable to sleep or sleeping all the time   · Unwillingness or inability to communicate  · Depression  · Unusual sadness, discouragement and loneliness  · Talk of wanting to die  · Neglect of personal appearance   · Rebelliousness- reckless behavior  · Withdrawal from people/activities they love  · Confusion- inability to concentrate     If you or a loved one observes any of these behaviors or has concerns about self-harm, here's what you can do:  · Talk about it- your feelings and reasons for harming yourself  · Remove any means that you might use to hurt yourself (examples: pills, rope, extension cords,  firearm)  · Get professional help from the community (Mental Health, Substance Abuse, psychological counseling)  · Do not be alone:Call your Safe Contact- someone whom you trust who will be there for you.  · Call your local CRISIS HOTLINE 453-9761 or 117-087-2992  · Call your local Children's Mobile Crisis Response Team [place] (474) 439-7085 or www.[employer]  · Call the toll free National Suicide Prevention Hotlines   · National Suicide Prevention Lifeline 655-240-IVFJ (4022)  · National Hope Line Network 800-SUICIDE (595-1851)

## 2017-06-21 NOTE — PROGRESS NOTES
Report received, assumed patient care.  Pt A&OX4.   at bedside.  Assessment completed.  Call light within reach, personal belongings available, bed in lowest position, pt calling for assistance.  Pt reports no pain.  Communication board updated, POC discussed.  Monitors applied, VSS.  No additional needs at this time.

## 2017-06-21 NOTE — PROGRESS NOTES
Discharge instructions, medications and follow-up reviewed with pt, pt verbalized understanding and denies questions. Discharge paperwork given to pt. PIV removed, TeleBox removed, armband removed. Pt decline wheelchair and hospital escort. Pt walk off unit with her .

## 2017-06-21 NOTE — PROGRESS NOTES
Report received, pt care assumed, tele box on.Pt assessment complete. Pt aaox4, no signs of distress noted at this time. POC discussed with pt and verbalizes no questions. Pt c/o of 1/10 pain in R abdomen, pt not needing any pain medication at this time. Pt denies any additional needs at this time. Bed in lowest position and locked. Pt educated on fall risk and verbalized understanding. Call light and personal belongings within reach. Will continue to monitor.

## 2017-06-21 NOTE — H&P
DATE OF ADMISSION:  2017    CHIEF COMPLAINT:  Palpitations.    PRIMARY CARE PHYSICIAN:  Micky Cornell MD    [facility]    DIAGNOSIS:  Palpitations, rule out acute coronary syndrome.    HISTORY OF CURRENT ILLNESS:  The patient is a 76-year-old female who reports   palpitations for the past 2 weeks.  She does report to the emergency room.  At   this point, the patient will be evaluated for pulmonary embolism.  She will   get a CT scan.  The patient following that will be admitted to the telemetry   unit.  She will be undergoing 3 sets of cardiac markers followed by a stress   test in the morning.  We will also get an echocardiogram and she will be   placed at this point on metoprolol for rate control.  The patient will be   trended with serial cardiac markers and we will at this point give her oxygen   support.  She will be given p.r.n. morphine and nitroglycerin.    PAST MEDICAL HISTORY:  Includes hypertension, history of seizures, history of   osteoporosis, history of psychosis.    PAST SURGICAL HISTORY:  None.    ALLERGIES:  No known drug allergies.    MEDICATIONS:  At the time of admission include Norvasc 10 mg p.o. daily,   Prolixin 2.5 mg p.o. daily, lisinopril 20 mg p.o. daily, Trileptal 150 mg p.o.   b.i.d., Evista 60 mg daily.    SOCIAL HISTORY:  She smoked 2 packs per day for 20 years, quit 5 years ago.    No alcohol use, no drug use.  She has an advanced directive.  She is DNR code   status.    FAMILY HISTORY:  Mother  of tuberculosis at age 70.  Father  of old   age at age 70.    REVIEW OF SYSTEMS:  She complains of palpitations for 2 weeks.  Denies any   headaches, fevers, chills, nausea, vomiting, chest pain, shortness of breath,   abdominal pain.  No recent travel, no leg swelling, no calf tenderness.  All   other review of systems were reviewed and are negative.    PHYSICAL EXAMINATION:  VITAL SIGNS:  Temperature 37.1, pulse 92, respirations 16, blood  pressure   147/95.  She is 65 kilograms in weight, 165 cm tall.  GENERAL:  She is currently alert, awake, oriented x3, pleasant, cooperative   female with her  at the bedside.  HEENT:  Head is atraumatic.  Eyes follow in normal range of gaze.  Pupils are   equal, round, reactive bilaterally.  Nose midline without discharge.  Ears   bilaterally intact without discharge.  Oral cavity, moist mucous membranes.    No apparent focus infection in the oral cavity.  NECK:  Soft and supple.  No JVD, carotid bruit, thyromegaly or lymphadenopathy   appreciated.  CHEST WALL:  Moves equal with inspiration and expiration.  No paradoxical   motion.  No reproducible chest wall tenderness.  HEART:  S1, S2.  No murmurs or gallops detected.  LUNGS:  At this point, diminished breath sounds bilaterally at the bases.  No   rales or rhonchi detected.  ABDOMEN:  Soft.  Bowel sounds present in all 4 quadrants.  No hepatomegaly.    No splenomegaly.  There is some rebound tenderness in the right upper quadrant   and left upper quadrant with history of previous pancreatitis.  GENITAL:  Deferred.  RECTAL:  Deferred.  EXTREMITIES:  Upper and lower extremities, positive pulses, no edema.  Good   muscle strength, good muscle tone.  Positive deep tendon reflexes.  NEUROLOGIC:  Cranial nerves II-XII grossly within normal limits without any   focal deficits appreciated.  SKIN:  Normal turgor.  No rashes or abrasions identified.    LABORATORY EVALUATION:  WBC count 4.2, hemoglobin is 13.3, hematocrit 40.6,   platelets 148.  Sodium 140, potassium 4.1, chloride 106, CO2 is 24, BUN 27,   creatinine 1.5, calcium 10.3 with a glucose of 91.  Liver function tests are   within normal limits.  GFR is down to 34.  Troponin less than 0.01.  Chest   x-ray, which I have reviewed myself at this point shows no acute   cardiopulmonary process identified.  CT of the chest is pending.  The   patient's 12-lead EKG at this point shows rate of 91, QT of 368, [place] of  216,   sinus rhythm.  There is a first-degree AV block and there are also at this   point T-wave inversions and ST depressions in the precordial leads, which are   new compared to the previous.    IMPRESSION AND PLAN:  At this point:  1.  Palpitations with EKG changes.  The patient will be ruled out for   pulmonary embolism.  If the pulmonary embolism is negative, the patient will   be undergoing 3 sets of cardiac markers under telemetric monitoring and a   stress test in the morning.  The patient may then be subjected to cardiac   catheterization.  2.  For hypertension, continue with Norvasc, lisinopril.  3.  For renal insufficiency, I will give her at this point fluid   resuscitation, monitor renal functions.  4.  For seizure disorder, continue with Trileptal.  5.  For history of psychosis, continue with Prolixin.  6.  For osteoporosis, continue with Evista.    She will be on DVT and GERD prophylaxis.  For the palpitations, I will start   her on metoprolol to keep the palpitations under control.       ____________________________________     MD PRIYA BHATTI / AUGUST    DD:  06/20/2017 15:33:02  DT:  06/20/2017 17:22:04    D#:  [id number]  Job#:  [id number]    cc: Micky Cornell MD

## 2021-01-13 DIAGNOSIS — Z23 NEED FOR VACCINATION: ICD-10-CM
